# Patient Record
Sex: MALE | Race: WHITE
[De-identification: names, ages, dates, MRNs, and addresses within clinical notes are randomized per-mention and may not be internally consistent; named-entity substitution may affect disease eponyms.]

---

## 2020-10-02 ENCOUNTER — HOSPITAL ENCOUNTER (OUTPATIENT)
Dept: HOSPITAL 75 - ENDO | Age: 70
Discharge: HOME | End: 2020-10-02
Attending: SURGERY
Payer: MEDICARE

## 2020-10-02 VITALS — SYSTOLIC BLOOD PRESSURE: 129 MMHG | DIASTOLIC BLOOD PRESSURE: 63 MMHG

## 2020-10-02 VITALS — DIASTOLIC BLOOD PRESSURE: 63 MMHG | SYSTOLIC BLOOD PRESSURE: 129 MMHG

## 2020-10-02 VITALS — SYSTOLIC BLOOD PRESSURE: 130 MMHG | DIASTOLIC BLOOD PRESSURE: 78 MMHG

## 2020-10-02 VITALS — DIASTOLIC BLOOD PRESSURE: 80 MMHG | SYSTOLIC BLOOD PRESSURE: 136 MMHG

## 2020-10-02 VITALS — DIASTOLIC BLOOD PRESSURE: 79 MMHG | SYSTOLIC BLOOD PRESSURE: 132 MMHG

## 2020-10-02 VITALS — WEIGHT: 264.78 LBS | BODY MASS INDEX: 37.07 KG/M2 | HEIGHT: 70.98 IN

## 2020-10-02 VITALS — SYSTOLIC BLOOD PRESSURE: 139 MMHG | DIASTOLIC BLOOD PRESSURE: 68 MMHG

## 2020-10-02 DIAGNOSIS — Z79.899: ICD-10-CM

## 2020-10-02 DIAGNOSIS — K64.1: Primary | ICD-10-CM

## 2020-10-02 DIAGNOSIS — K59.00: ICD-10-CM

## 2020-10-02 DIAGNOSIS — N40.0: ICD-10-CM

## 2020-10-02 DIAGNOSIS — R19.7: ICD-10-CM

## 2020-10-02 DIAGNOSIS — I10: ICD-10-CM

## 2020-10-02 DIAGNOSIS — Z86.010: ICD-10-CM

## 2020-10-02 NOTE — PROGRESS NOTE-PRE OPERATIVE
Pre-Operative Progress Note


H&P Reviewed


The H&P was reviewed, patient examined and no changes noted.


Date Seen by Provider:  Oct 2, 2020


Time Seen by Provider:  10:30


Date H&P Reviewed:  Oct 2, 2020


Time H&P Reviewed:  10:30


Pre-Operative Diagnosis:  screening DILLAN Root MD                 Oct 2, 2020 10:59

## 2020-10-02 NOTE — OPERATIVE REPORT
DATE OF SERVICE:  10/02/2020



ATTENDING PRIMARY CARE PHYSICIAN:

Dr. Vinayak Jack.



PREOPERATIVE DIAGNOSES:

Change in bowel habits with diarrhea and constipation, screening colonoscopy.



POSTOPERATIVE DIAGNOSIS:

Mild chronic stage II external and internal hemorrhoids.



PROCEDURE:

Colonoscopy.



SURGEON:

Dillan Reeves MD



ANESTHESIA:

Monitored anesthesia care.



ESTIMATED BLOOD LOSS:

Minimal.



FINDINGS:

Mild chronic stage II external and internal hemorrhoids.



DISPOSITION:

The patient tolerated the procedure well.



INDICATIONS:

The patient is a 70-year-old male in need of a screening colonoscopy.  He

reports his last colonoscopy was approximately 10 to 15 years ago and does not

remember any abnormalities.  He does report in the past four months, he has had

intermittent episodes of diarrhea and constipation.  He does not report any

change in water source as well as no change in diet.  He does not report any red

blood per rectum nor any dark tarry stools as well as any inadvertent weight

loss as well.  He also does not report any family history of colon cancer.



DESCRIPTION OF PROCEDURE:

The patient was brought to the endoscopy suite, laid in the left lateral

decubitus position.  After adequate IV pain and sedated medications and

monitored anesthesia care, digital rectal examination was performed.  Mild

chronic stage II external and internal hemorrhoids were identified, which were

not actively edematous nor inflamed and no bleeding.  Normal sphincter tone was

felt and there were no palpable masses.  Prostate gland was palpable and

appeared normal.



The endoscope was then intubated to the anus and rectum gently insufflated.  The

endoscope was then advanced through the valves of Thorne of the rectum with no

polyps or any neoplasms identified.  Through the sigmoid colon, no

diverticulosis identified.  The endoscope was then advanced to the remainder of

the descending, transverse and ascending colon to the cecum.  These segments

were normal.  There were no polyps or any neoplasms identified throughout the

colon or rectum.  The endoscope was then slowly withdrawn while taking a second

look and suctioning of residual air with no additional findings.



The patient tolerated the procedure well.  We will recommend the necessary

lifestyle and diet accommodation including incorporation of a high fiber diet

through supplemental means to equal or exceed 30 grams daily as well as

significant amounts of water to promote soft stools on a daily basis.  If he

does have continued _____ episodes of diarrhea and constipation, this may be

related to an upper gastrointestinal source including gastritis; however, we

also cannot rule out gallbladder as an etiology and we will proceed with further

testing.





Job ID: [id number]

DocumentID: [id number]

Dictated Date:  10/02/2020 12:15:55

Transcription Date: 10/02/2020 20:38:00

Dictated By: DILLAN REEVES MD

## 2020-10-02 NOTE — PROGRESS NOTE-POST OPERATIVE
Post-Operative Progess Note


Surgeon (s)/Assistant (s)


Surgeon


DILLAN REEVES MD


Assistant:  none





Pre-Operative Diagnosis


screening colo





Post-Operative Diagnosis





mild chronic stage 2 ext and int hemorrhoids.





Procedure & Operative Findings


Date of Procedure


10/2/20


Procedure Performed/Findings


Colonoscopy


Anesthesia Type


mac





Estimated Blood Loss


Estimated blood loss (mL):  minimal





Specimens/Packing


Specimens Removed


none











DILLAN REEVES MD                 Oct 2, 2020 12:19

## 2020-10-07 NOTE — ANESTHESIA-GENERAL POST-OP
MAC


Patient Condition


Mental Status/LOC:  Same as Preop


Cardiovascular:  Satisfactory


Nausea/Vomiting:  Absent


Respiratory:  Satisfactory


Pain:  Controlled


Complications:  Absent





Post Op Complications


Complications


None





Follow Up Care/Instructions


Patient Instructions


None needed.





Anesthesiology Discharge Order


Discharge Order


Patient is doing well, no complaints, stable vital signs, no apparent adverse 

anesthesia problems.   


No complications reported per nursing.











KRISTA ARGUETA CRNA             Oct 7, 2020 12:51

## 2023-08-03 ENCOUNTER — HOSPITAL ENCOUNTER (OUTPATIENT)
Dept: HOSPITAL 75 - RAD | Age: 73
End: 2023-08-03
Attending: FAMILY MEDICINE
Payer: MEDICARE

## 2023-08-03 DIAGNOSIS — M47.26: Primary | ICD-10-CM

## 2023-08-03 DIAGNOSIS — M48.061: ICD-10-CM

## 2023-08-03 PROCEDURE — 72148 MRI LUMBAR SPINE W/O DYE: CPT

## 2023-08-03 NOTE — DIAGNOSTIC IMAGING REPORT
PROCEDURE: MRI lumbar spine.



TECHNIQUE: Multiplanar, multisequence MRI of the lumbar spine was

performed without contrast.



INDICATION:  Spinal pain, symptoms progressive over the past 2

weeks.



Multiple multisequence lumbar MRI performed, images extend

through the T11 superior endplate.



Vertebral statures normal, the alignment anatomic. The lower cord

and conus appeared normal. There is a normal dispersal of the

nerves of the cauda equina. There is no paravertebral mass,

hemorrhage or fluid collection. No suspicious marrow edema. There

is some very slight degenerative endplate edematous changes

anteriorly at the L2 superior endplate eccentric to the right. No

fracture.



T12-L1: Osteophyte disc material anteriorly results in no

stenosis.



L1-L2: Mild anterior greater than posterior osteophyte disc

material results in no stenosis.



L2-L3: Ligamentum flavum thickening and facet arthrosis with disc

bulge and endplate osteophytes result in mild canal stenosis.

There is mild right and minimal left neural foraminal narrowing.



L3-L4: Thickened ligament flava, facet arthrosis, disc bulge,

endplate osteophytes and prominence of the dorsal epidural fat

result in a moderate degree of canal stenosis. There is mild to

moderate right and mild left neural foraminal stenoses.



L4-L5: Some prominence of the dorsal epidural fat, thickened

ligamenta flava and facet arthrosis with mild disc bulge

resulting in a moderate severity of canal stenosis. There is mild

biforaminal narrowing.



L5-S1: This is slight disc bulge and endplate osteophytes with

very mild biforaminal narrowing greater right. No substantial

canal or recess stenosis.



IMPRESSION:  

1. Degenerative changes aligned anatomically with no acute bony

pathology with multilevel mild to moderate canal and foraminal

stenoses. No acute-appearing pathology.



Dictated by: 



  Dictated on workstation # [id number]